# Patient Record
Sex: FEMALE | Race: WHITE | NOT HISPANIC OR LATINO | URBAN - METROPOLITAN AREA
[De-identification: names, ages, dates, MRNs, and addresses within clinical notes are randomized per-mention and may not be internally consistent; named-entity substitution may affect disease eponyms.]

---

## 2017-01-03 ENCOUNTER — OFFICE VISIT (OUTPATIENT)
Dept: ENDOCRINOLOGY | Age: 62
End: 2017-01-03

## 2017-01-03 VITALS
BODY MASS INDEX: 29.88 KG/M2 | HEIGHT: 64 IN | HEART RATE: 83 BPM | DIASTOLIC BLOOD PRESSURE: 78 MMHG | SYSTOLIC BLOOD PRESSURE: 118 MMHG | WEIGHT: 175 LBS

## 2017-01-03 DIAGNOSIS — N95.1 SYMPTOMATIC MENOPAUSAL OR FEMALE CLIMACTERIC STATES: Primary | ICD-10-CM

## 2017-01-03 DIAGNOSIS — I10 ESSENTIAL HYPERTENSION: ICD-10-CM

## 2017-01-03 PROCEDURE — 99214 OFFICE O/P EST MOD 30 MIN: CPT | Performed by: INTERNAL MEDICINE

## 2017-01-03 NOTE — LETTER
January 3, 2017     Emilie Sanchez MD  [facility] [address]  [facility] [address]    Patient: Jose Haley   YOB: 1955   Date of Visit: 1/3/2017       Dear Dr. Daniel MD:    Jose Haley was in my office today. Below are the relevant portions of my assessment and plan of care.           If you have questions, please do not hesitate to call me. I look forward to following Jose along with you.         Sincerely,        Clark Ritter MD        CC: No Recipients

## 2017-01-03 NOTE — PROGRESS NOTES
61 y.o.    Patient Care Team:  Emilie Sanchez MD as PCP - General (Internal Medicine)  No Known Provider as PCP - Family Medicine    Chief Complaint:      Subjective     HPI   Jose Haley,61 y.o. WF is here as a follow up for the evaluation of menopausal HRT evaluation. Referred by Dr. Sanchez.   Pt has been on estrogen conjugated - medroxyprogesterone (PREMPRO) 0.45-1.5 MG per tablet for 8 - 10 years. It has been started by her Gynecologist. Pt has been trying to get herself off of the HRT for the last 4 months. Also tried 1 year ago and failed. When she stops the medication she gets mood changes, hot flashes and reports that her BP goes up. Pt was started on the medication 10 years ago due to menopausal symptoms mainly night sweats. She was 51 years old when she was first started on the medication.   Pt wants to take herself off of the HRT because cancer runs in her family mainly (Lung ca), Maternal grand father - colon cancer, first cousin on paternal side - had breast cancer.   During last visit i discussed with the pt to wean herself off of HRT slowly.   She is currently off of estrogen, conjugated, progesterone since thanks giving. She has been started on Venlafaxine XR 75 mg po daily last visit. She reports that her mood is stable now, no more night sweats, hot flashes occasionally.   She lost about 5 pounds of weight since last visit.     She reports that her BP at home - 140/82 mm of hg. Her BP today in the clinic - 118/78 mm of hg.     Interval History      The following portions of the patient's history were reviewed and updated as appropriate: allergies, current medications, past family history, past medical history, past social history, past surgical history and problem list.    No past medical history on file.  No family history on file.  Social History     Social History   • Marital status: Single     Spouse name: N/A   • Number of children: N/A   • Years of education: N/A     Occupational  History   • Not on file.     Social History Main Topics   • Smoking status: Never Smoker   • Smokeless tobacco: Not on file   • Alcohol use Not on file   • Drug use: Not on file   • Sexual activity: Not on file     Other Topics Concern   • Not on file     Social History Narrative     Allergies   Allergen Reactions   • Latex      RASH       Current Outpatient Prescriptions:   •  B COMPLEX VITAMINS SL, Take  by mouth., Disp: , Rfl:   •  Omega-3 Fatty Acids (FISH OIL) 1000 MG capsule capsule, Take  by mouth Daily With Breakfast., Disp: , Rfl:   •  Probiotic Product (PROBIOTIC DAILY PO), Take  by mouth., Disp: , Rfl:   •  venlafaxine XR (EFFEXOR XR) 75 MG 24 hr capsule, Take 1 capsule by mouth Daily., Disp: 30 capsule, Rfl: 2  •  vitamin E 100 UNIT capsule, Take 100 Units by mouth Daily., Disp: , Rfl:         Review of Systems   Constitutional: Negative for appetite change, chills, diaphoresis and fatigue.   HENT: Positive for postnasal drip. Negative for hearing loss, nosebleeds, trouble swallowing and voice change.    Eyes: Negative for photophobia, discharge and visual disturbance.   Respiratory: Negative for cough, shortness of breath and wheezing.    Cardiovascular: Negative for chest pain, palpitations and leg swelling.   Gastrointestinal: Positive for abdominal pain. Negative for abdominal distention, constipation, diarrhea, nausea and vomiting.   Endocrine: Positive for polydipsia. Negative for cold intolerance, heat intolerance and polyuria.   Genitourinary: Negative for dysuria, frequency and hematuria.   Musculoskeletal: Negative for arthralgias, back pain and myalgias.   Skin: Negative for pallor, rash and wound.   Neurological: Negative for dizziness, tremors, seizures, syncope, weakness, numbness and headaches.   Hematological: Negative for adenopathy.   Psychiatric/Behavioral: Negative for agitation, confusion and sleep disturbance. The patient is not nervous/anxious.        Objective       Vitals:     "01/03/17 1025   BP: 118/78   Pulse: 83   Weight: 175 lb (79.4 kg)   Height: 64\" (162.6 cm)     Body mass index is 30.04 kg/(m^2).      Physical Exam   Constitutional: She is oriented to person, place, and time. She appears well-developed and well-nourished. No distress.   HENT:   Head: Normocephalic and atraumatic.   Mouth/Throat: Oropharynx is clear and moist.   Eyes: Conjunctivae and EOM are normal. Pupils are equal, round, and reactive to light. No scleral icterus.   Neck: Normal range of motion. Neck supple. No tracheal deviation present. No thyromegaly present.   Cardiovascular: Normal rate, regular rhythm and normal heart sounds.  Exam reveals no friction rub.    No murmur heard.  Pulmonary/Chest: Effort normal and breath sounds normal. No stridor. She has no wheezes. She has no rales.   Abdominal: Soft. Bowel sounds are normal. She exhibits no distension. There is no tenderness.   Musculoskeletal: Normal range of motion. She exhibits no edema.   Lymphadenopathy:     She has no cervical adenopathy.   Neurological: She is alert and oriented to person, place, and time. She has normal reflexes. She displays normal reflexes.   Skin: Skin is warm and dry. She is not diaphoretic.   Psychiatric: She has a normal mood and affect. Judgment normal.   Vitals reviewed.    Results Review:     I reviewed the patient's new clinical results.    Medical records reviewed  Summary:Done      Office Visit on 10/25/2016   Component Date Value Ref Range Status   • 25 Hydroxy, Vitamin D 10/25/2016 29.3* 30.0 - 100.0 ng/mL Final    Comment: Reference Range for Total Vitamin D 25(OH)  Deficiency    <20.0 ng/mL  Insufficiency 21-29 ng/mL  Sufficiency    ng/mL  Toxicity      >100 ng/ml        • Vitamin B-12 10/25/2016 392  211 - 946 pg/mL Final   • Folate 10/25/2016 16.25  7.30 - 26.10 ng/mL Final   • Hemoglobin A1C 10/25/2016 5.19  4.80 - 5.60 % Final    Comment: Hemoglobin A1C Ranges:  Increased Risk for Diabetes  5.7% to " 6.4%  Diabetes                     >= 6.5%  Diabetic Goal                < 7.0%     • TSH 10/25/2016 2.450  0.270 - 4.200 mIU/mL Final   • Glucose 10/25/2016 120* 65 - 99 mg/dL Final   • BUN 10/25/2016 12  6 - 20 mg/dL Final   • Creatinine 10/25/2016 0.68  0.57 - 1.00 mg/dL Final   • eGFR Non  Am 10/25/2016 89  >60 mL/min/1.73 Final   • eGFR African Am 10/25/2016 107  >60 mL/min/1.73 Final   • BUN/Creatinine Ratio 10/25/2016 17.6  7.0 - 25.0 Final   • Sodium 10/25/2016 143  136 - 145 mmol/L Final   • Potassium 10/25/2016 4.3  3.5 - 5.2 mmol/L Final   • Chloride 10/25/2016 102  98 - 107 mmol/L Final   • Total CO2 10/25/2016 24.5  22.0 - 29.0 mmol/L Final   • Calcium 10/25/2016 10.0  8.6 - 10.5 mg/dL Final   • Total Protein 10/25/2016 6.9  6.0 - 8.5 g/dL Final   • Albumin 10/25/2016 4.90  3.50 - 5.20 g/dL Final   • Globulin 10/25/2016 2.0  gm/dL Final   • A/G Ratio 10/25/2016 2.5  g/dL Final   • Total Bilirubin 10/25/2016 0.2  0.1 - 1.2 mg/dL Final   • Alkaline Phosphatase 10/25/2016 64  39 - 117 U/L Final   • AST (SGOT) 10/25/2016 12  1 - 32 U/L Final   • ALT (SGPT) 10/25/2016 15  1 - 33 U/L Final   • Total Cholesterol 10/25/2016 189  0 - 200 mg/dL Final   • Triglycerides 10/25/2016 376* 0 - 150 mg/dL Final   • HDL Cholesterol 10/25/2016 49  40 - 60 mg/dL Final   • VLDL Cholesterol 10/25/2016 75.2* 5 - 40 mg/dL Final   • LDL Cholesterol  10/25/2016 65  0 - 100 mg/dL Final     Lab Results   Component Value Date    HGBA1C 5.19 10/25/2016     Lab Results   Component Value Date    CREATININE 0.68 10/25/2016     Imaging Results (most recent)     None                Assessment and Plan:    Diagnoses and all orders for this visit:    Symptomatic menopausal or female climacteric states  -     Vitamin D 25 Hydroxy; Future  -     Comprehensive Metabolic Panel; Future  -     TSH; Future  -     Lipid Panel; Future    Essential hypertension  -     Vitamin D 25 Hydroxy; Future  -     Comprehensive Metabolic Panel; Future  -      TSH; Future  -     Lipid Panel; Future    Menopausal symptoms  Will continue venlafaxine 75 mg oral daily  Patient is currently off of hormone replacement therapy and feels better on venlafaxine.    Vitamin D deficiency  Advised patient to take 2 pills of over-the-counter vitamin D.    Will repeat lipid panel prior to her next visit.    The total time spent more than 25 min for lab review and face- to- face, of which greater than 50% of time was spent in counseling the patient on treatment options, instructions for management/treatment and follow up and importance of compliance with chosen management or treatment options

## 2017-01-03 NOTE — MR AVS SNAPSHOT
Jose Haley   1/3/2017 10:15 AM   Office Visit    Dept Phone:  [phone]   Encounter #:  [id number]    Provider:  Clark Ritter MD   Department:  [facility] ENDOCRINOLOGY                Your Full Care Plan              Today's Medication Changes          These changes are accurate as of: 1/3/17 11:05 AM.  If you have any questions, ask your nurse or doctor.               Stop taking medication(s)listed here:     PREMPRO 0.45-1.5 MG per tablet   Generic drug:  estrogen (conjugated)-medroxyprogesterone   Stopped by:  Clark Ritter MD                      Your Updated Medication List          This list is accurate as of: 1/3/17 11:05 AM.  Always use your most recent med list.                B COMPLEX VITAMINS SL       fish oil 1000 MG capsule capsule       PROBIOTIC DAILY PO       venlafaxine XR 75 MG 24 hr capsule   Commonly known as:  EFFEXOR XR   Take 1 capsule by mouth Daily.       vitamin E 100 UNIT capsule               You Were Diagnosed With        Codes Comments    Symptomatic menopausal or female climacteric states    -  Primary ICD-10-CM: N95.1  ICD-9-CM: 627.2     Essential hypertension     ICD-10-CM: I10  ICD-9-CM: 401.9       Instructions     None    Patient Instructions History      Upcoming Appointments     Visit Type Date Time Department    OFFICE VISIT 1/3/2017 10:15 AM [facility]    [facility] 3/20/2017  8:40 AM [facility]    OFFICE VISIT 4/3/2017  8:30 AM [facility]      [facility] Signup     Our records indicate that you have an active [employer] account.    You can view your After Visit Summary by going to [employer] and logging in with your [employer] username and password.  If you don't have a [facility] username and password but a parent or guardian has access to your record, the parent or guardian should login with their own [employer] username and password and access your record to view the After Visit  "Summary.    If you have questions, you can email [email or web address] or call [phone] to talk to our MyChart staff.  Remember, [facility] is NOT to be used for urgent needs.  For medical emergencies, dial 911.               Other Info from Your Visit           Your Appointments     Mar 20, 2017  8:40 AM EDT   [facility]   [facility] ENDOCRINOLOGY (--)    [address] [place] [id number] [phone]            Apr 03, 2017  8:30 AM EDT   Office Visit with Clark Ritter MD   [facility] ENDOCRINOLOGY (--)    [address] [place] [id number] [phone]           Arrive 15 minutes prior to appointment.              Allergies     Latex      RASH      Vital Signs     Blood Pressure Pulse Height Weight Body Mass Index Smoking Status    118/78 83 64\" (162.6 cm) 175 lb (79.4 kg) 30.04 kg/m2 Never Smoker      Problems and Diagnoses Noted     Menopause    -  Primary    High blood pressure            "

## 2017-01-03 NOTE — LETTER
January 3, 2017     Emilie Sanchez MD  [facility] [address]  [facility] [address]    Patient: Jose Haley   YOB: 1955   Date of Visit: 1/3/2017       Dear Dr. Daniel MD:    Thank you for referring Jose Haley to me for evaluation. Below are the relevant portions of my assessment and plan of care.                   If you have questions, please do not hesitate to call me. I look forward to following Jose along with you.         Sincerely,        Clark Ritter MD        CC: No Recipients

## 2017-01-19 RX ORDER — VENLAFAXINE HYDROCHLORIDE 75 MG/1
CAPSULE, EXTENDED RELEASE ORAL
Qty: 30 CAPSULE | Refills: 1 | Status: SHIPPED | OUTPATIENT
Start: 2017-01-19 | End: 2017-03-17 | Stop reason: SDUPTHER

## 2017-02-28 ENCOUNTER — TELEPHONE (OUTPATIENT)
Dept: ENDOCRINOLOGY | Age: 62
End: 2017-02-28

## 2017-02-28 NOTE — TELEPHONE ENCOUNTER
Patient left message though my chart about appt  Have been trying to reach patient left voice mail for call back

## 2017-03-17 RX ORDER — VENLAFAXINE HYDROCHLORIDE 75 MG/1
CAPSULE, EXTENDED RELEASE ORAL
Qty: 30 CAPSULE | Refills: 0 | Status: SHIPPED | OUTPATIENT
Start: 2017-03-17 | End: 2017-04-12 | Stop reason: SDUPTHER

## 2017-03-20 ENCOUNTER — LAB (OUTPATIENT)
Dept: ENDOCRINOLOGY | Age: 62
End: 2017-03-20

## 2017-03-20 DIAGNOSIS — E55.9 VITAMIN D DEFICIENCY: ICD-10-CM

## 2017-03-20 DIAGNOSIS — N95.1 SYMPTOMATIC MENOPAUSAL OR FEMALE CLIMACTERIC STATES: ICD-10-CM

## 2017-03-20 DIAGNOSIS — E55.9 VITAMIN D DEFICIENCY: Primary | ICD-10-CM

## 2017-03-20 DIAGNOSIS — I10 ESSENTIAL HYPERTENSION: ICD-10-CM

## 2017-03-20 LAB
ALBUMIN SERPL-MCNC: 4.5 G/DL (ref 3.5–5.2)
ALBUMIN/GLOB SERPL: 2.1 G/DL
ALP SERPL-CCNC: 78 U/L (ref 39–117)
ALT SERPL-CCNC: 41 U/L (ref 1–33)
AST SERPL-CCNC: 25 U/L (ref 1–32)
BILIRUB SERPL-MCNC: 0.3 MG/DL (ref 0.1–1.2)
BUN SERPL-MCNC: 15 MG/DL (ref 6–20)
BUN/CREAT SERPL: 17.2 (ref 7–25)
CALCIUM SERPL-MCNC: 9.6 MG/DL (ref 8.6–10.5)
CHLORIDE SERPL-SCNC: 105 MMOL/L (ref 98–107)
CHOLEST SERPL-MCNC: 176 MG/DL (ref 0–200)
CO2 SERPL-SCNC: 24.4 MMOL/L (ref 22–29)
CREAT SERPL-MCNC: 0.87 MG/DL (ref 0.57–1)
GLOBULIN SER CALC-MCNC: 2.1 GM/DL
GLUCOSE SERPL-MCNC: 132 MG/DL (ref 65–99)
HDLC SERPL-MCNC: 45 MG/DL (ref 40–60)
LDLC SERPL CALC-MCNC: 95 MG/DL (ref 0–100)
POTASSIUM SERPL-SCNC: 4.4 MMOL/L (ref 3.5–5.2)
PROT SERPL-MCNC: 6.6 G/DL (ref 6–8.5)
SODIUM SERPL-SCNC: 142 MMOL/L (ref 136–145)
TRIGL SERPL-MCNC: 179 MG/DL (ref 0–150)
TSH SERPL DL<=0.005 MIU/L-ACNC: 3.18 MIU/ML (ref 0.27–4.2)
VLDLC SERPL CALC-MCNC: 35.8 MG/DL (ref 5–40)

## 2017-03-21 LAB — 25(OH)D3+25(OH)D2 SERPL-MCNC: 25.2 NG/ML (ref 30–100)

## 2017-04-10 ENCOUNTER — OFFICE VISIT (OUTPATIENT)
Dept: ENDOCRINOLOGY | Age: 62
End: 2017-04-10

## 2017-04-10 VITALS
HEIGHT: 63 IN | WEIGHT: 179.2 LBS | HEART RATE: 93 BPM | SYSTOLIC BLOOD PRESSURE: 134 MMHG | OXYGEN SATURATION: 93 % | BODY MASS INDEX: 31.75 KG/M2 | DIASTOLIC BLOOD PRESSURE: 80 MMHG

## 2017-04-10 DIAGNOSIS — E55.9 VITAMIN D DEFICIENCY: ICD-10-CM

## 2017-04-10 DIAGNOSIS — N95.1 SYMPTOMATIC MENOPAUSAL OR FEMALE CLIMACTERIC STATES: Primary | ICD-10-CM

## 2017-04-10 DIAGNOSIS — N95.1 HOT FLASHES DUE TO MENOPAUSE: ICD-10-CM

## 2017-04-10 DIAGNOSIS — R73.01 IMPAIRED FASTING GLUCOSE: ICD-10-CM

## 2017-04-10 PROCEDURE — 99214 OFFICE O/P EST MOD 30 MIN: CPT | Performed by: INTERNAL MEDICINE

## 2017-04-10 NOTE — PROGRESS NOTES
62 y.o.    Patient Care Team:  Emilie Sanchez MD as PCP - General (Internal Medicine)  No Known Provider as PCP - Family Medicine    Chief Complaint:      Subjective     HPI  Jose Haley,61 y.o. WF is here as a follow up for the evaluation of menopausal symptoms-hot flashes.  Referred by Dr. Sanchez.     Pt has been on estrogen conjugated - medroxyprogesterone (PREMPRO) 0.45-1.5 MG per tablet for 8 - 10 years. It has been started by her Gynecologist. Pt was started on the medication 10 years ago due to menopausal symptoms mainly night sweats.  Patient has a strong family history of cancers mainly lung cancer, colon cancer and first cousin on the paternal side has history of breast cancer.   Patient stopped hormone replacement therapy in November 2016.    Patient was started on venlafaxine XL 75 mg oral daily, currently tolerating the medication with no side effects, reports that her mood is stable now, no more night sweats but does have hot flashes 4-5 times a day.    Reports that her weight is stable.      Interval History      The following portions of the patient's history were reviewed and updated as appropriate: allergies, current medications, past family history, past medical history, past social history, past surgical history and problem list.    No past medical history on file.  No family history on file.  Social History     Social History   • Marital status: Single     Spouse name: N/A   • Number of children: N/A   • Years of education: N/A     Occupational History   • Not on file.     Social History Main Topics   • Smoking status: Never Smoker   • Smokeless tobacco: Not on file   • Alcohol use Not on file   • Drug use: Not on file   • Sexual activity: Not on file     Other Topics Concern   • Not on file     Social History Narrative     Allergies   Allergen Reactions   • Latex      RASH       Current Outpatient Prescriptions:   •  B COMPLEX VITAMINS SL, Take  by mouth., Disp: , Rfl:   •  Omega-3  "Fatty Acids (FISH OIL) 1000 MG capsule capsule, Take  by mouth Daily With Breakfast., Disp: , Rfl:   •  Probiotic Product (PROBIOTIC DAILY PO), Take  by mouth., Disp: , Rfl:   •  venlafaxine XR (EFFEXOR-XR) 75 MG 24 hr capsule, TAKE ONE CAPSULE BY MOUTH DAILY, Disp: 30 capsule, Rfl: 0  •  vitamin E 100 UNIT capsule, Take 100 Units by mouth Daily., Disp: , Rfl:         Review of Systems   Constitutional: Negative for appetite change, chills and fatigue.   HENT: Positive for postnasal drip. Negative for nosebleeds, trouble swallowing and voice change.    Eyes: Negative for photophobia, discharge and visual disturbance.   Respiratory: Positive for cough. Negative for shortness of breath and wheezing.    Cardiovascular: Negative for chest pain, palpitations and leg swelling.   Gastrointestinal: Negative for abdominal pain, constipation, diarrhea, nausea and vomiting.   Endocrine: Positive for heat intolerance. Negative for cold intolerance, polydipsia and polyuria.   Genitourinary: Negative for dysuria, frequency and hematuria.   Musculoskeletal: Negative for arthralgias, back pain and myalgias.   Skin: Negative for rash and wound.   Neurological: Negative for dizziness, tremors, seizures, syncope, weakness, numbness and headaches.   Hematological: Negative for adenopathy.   Psychiatric/Behavioral: Negative for agitation, confusion and sleep disturbance. The patient is not nervous/anxious.        Objective       Vitals:    04/10/17 1240   BP: 134/80   Pulse: 93   SpO2: 93%   Weight: 179 lb 3.2 oz (81.3 kg)   Height: 63\" (160 cm)     Body mass index is 31.74 kg/(m^2).      Physical Exam   Constitutional: She is oriented to person, place, and time. She appears well-nourished.   HENT:   Head: Normocephalic and atraumatic.   Eyes: Conjunctivae and EOM are normal. No scleral icterus.   Neck: Normal range of motion. Neck supple. No thyromegaly present.   Cardiovascular: Normal rate and normal heart sounds.  Exam reveals no " friction rub.    No murmur heard.  Pulmonary/Chest: Effort normal and breath sounds normal. No stridor. She has no wheezes. She has no rales.   Abdominal: Soft. Bowel sounds are normal. She exhibits no distension. There is no tenderness.   Musculoskeletal: She exhibits no edema or tenderness.   Lymphadenopathy:     She has no cervical adenopathy.   Neurological: She is alert and oriented to person, place, and time.   Skin: Skin is warm and dry. She is not diaphoretic.   Psychiatric: She has a normal mood and affect.   Vitals reviewed.    Results Review:     I reviewed the patient's new clinical results.    Medical records reviewed  Summary:Done      Lab on 03/20/2017   Component Date Value Ref Range Status   • Glucose 03/20/2017 132* 65 - 99 mg/dL Final   • BUN 03/20/2017 15  6 - 20 mg/dL Final   • Creatinine 03/20/2017 0.87  0.57 - 1.00 mg/dL Final   • eGFR Non  Am 03/20/2017 67  >60 mL/min/1.73 Final   • eGFR African Am 03/20/2017 81  >60 mL/min/1.73 Final   • BUN/Creatinine Ratio 03/20/2017 17.2  7.0 - 25.0 Final   • Sodium 03/20/2017 142  136 - 145 mmol/L Final   • Potassium 03/20/2017 4.4  3.5 - 5.2 mmol/L Final   • Chloride 03/20/2017 105  98 - 107 mmol/L Final   • Total CO2 03/20/2017 24.4  22.0 - 29.0 mmol/L Final   • Calcium 03/20/2017 9.6  8.6 - 10.5 mg/dL Final   • Total Protein 03/20/2017 6.6  6.0 - 8.5 g/dL Final   • Albumin 03/20/2017 4.50  3.50 - 5.20 g/dL Final   • Globulin 03/20/2017 2.1  gm/dL Final   • A/G Ratio 03/20/2017 2.1  g/dL Final   • Total Bilirubin 03/20/2017 0.3  0.1 - 1.2 mg/dL Final   • Alkaline Phosphatase 03/20/2017 78  39 - 117 U/L Final   • AST (SGOT) 03/20/2017 25  1 - 32 U/L Final   • ALT (SGPT) 03/20/2017 41* 1 - 33 U/L Final   • TSH 03/20/2017 3.180  0.270 - 4.200 mIU/mL Final   • Total Cholesterol 03/20/2017 176  0 - 200 mg/dL Final   • Triglycerides 03/20/2017 179* 0 - 150 mg/dL Final   • HDL Cholesterol 03/20/2017 45  40 - 60 mg/dL Final   • VLDL Cholesterol  03/20/2017 35.8  5 - 40 mg/dL Final   • LDL Cholesterol  03/20/2017 95  0 - 100 mg/dL Final   • 25 Hydroxy, Vitamin D 03/20/2017 25.2* 30.0 - 100.0 ng/mL Final    Comment: Vitamin D deficiency has been defined by the [place] of  Medicine and an Endocrine Society practice guideline as a  level of serum 25-OH vitamin D less than 20 ng/mL (1,2).  The Endocrine Society went on to further define vitamin D  insufficiency as a level between 21 and 29 ng/mL (2).  1. IOM ([place] of Medicine). 2010. Dietary reference     intakes for calcium and D. [place]: The     National [employer].  2. Angel MF, Nima KHANNA, Mickey BENNETT, et al.     Evaluation, treatment, and prevention of vitamin D     deficiency: an Endocrine Society clinical practice     guideline. JCEM. 2011 Jul; 96(7):1911-30.       Lab Results   Component Value Date    HGBA1C 5.19 10/25/2016     Lab Results   Component Value Date    CREATININE 0.87 03/20/2017     Imaging Results (most recent)     None                Assessment and Plan:    Diagnoses and all orders for this visit:    Symptomatic menopausal or female climacteric states    Other orders  -     Hemoglobin A1c; Future  -     Vitamin B12 & Folate; Future  -     Vitamin D 25 Hydroxy; Future  -     TSH; Future    Hot flashes  Continue Effexor-XR 75 mg oral daily.  Discussed about increasing the dose or changing the medication for better control of hot flashes.  Patient at this point didnt want to make any changes.  She would rather give us a call in case if her symptoms or worse.    Will check HbA1c prior to next visit.  Discussed with the patient about mildly elevated fasting blood sugar and advised her to watch her diet and exercise.    Vitamin D deficiency  Taking over-the-counter vitamin D supplementation     The total time spent more than 25 min for old record and lab review and face- to- face, of which greater than 50% of time was spent in counseling the patient on treatment  options, instructions for management/treatment and follow up and importance of compliance with chosen management or treatment options

## 2017-04-12 RX ORDER — VENLAFAXINE HYDROCHLORIDE 75 MG/1
CAPSULE, EXTENDED RELEASE ORAL
Qty: 30 CAPSULE | Refills: 0 | Status: SHIPPED | OUTPATIENT
Start: 2017-04-12 | End: 2017-05-08 | Stop reason: SDUPTHER

## 2017-05-08 RX ORDER — VENLAFAXINE HYDROCHLORIDE 75 MG/1
CAPSULE, EXTENDED RELEASE ORAL
Qty: 30 CAPSULE | Refills: 0 | Status: SHIPPED | OUTPATIENT
Start: 2017-05-08 | End: 2017-06-06 | Stop reason: SDUPTHER

## 2017-06-06 RX ORDER — VENLAFAXINE HYDROCHLORIDE 75 MG/1
CAPSULE, EXTENDED RELEASE ORAL
Qty: 30 CAPSULE | Refills: 0 | Status: SHIPPED | OUTPATIENT
Start: 2017-06-06 | End: 2017-07-05 | Stop reason: SDUPTHER

## 2017-07-05 RX ORDER — VENLAFAXINE HYDROCHLORIDE 75 MG/1
CAPSULE, EXTENDED RELEASE ORAL
Qty: 30 CAPSULE | Refills: 0 | Status: SHIPPED | OUTPATIENT
Start: 2017-07-05 | End: 2017-08-15 | Stop reason: SDUPTHER

## 2017-08-02 RX ORDER — VENLAFAXINE HYDROCHLORIDE 75 MG/1
CAPSULE, EXTENDED RELEASE ORAL
Qty: 30 CAPSULE | Refills: 0 | OUTPATIENT
Start: 2017-08-02

## 2017-08-15 RX ORDER — VENLAFAXINE HYDROCHLORIDE 75 MG/1
CAPSULE, EXTENDED RELEASE ORAL
Qty: 30 CAPSULE | Refills: 1 | Status: SHIPPED | OUTPATIENT
Start: 2017-08-15 | End: 2017-10-10 | Stop reason: SDUPTHER

## 2017-09-26 ENCOUNTER — LAB (OUTPATIENT)
Dept: ENDOCRINOLOGY | Age: 62
End: 2017-09-26

## 2017-09-26 DIAGNOSIS — E55.9 VITAMIN D DEFICIENCY: ICD-10-CM

## 2017-09-26 DIAGNOSIS — N95.1 HOT FLASHES DUE TO MENOPAUSE: ICD-10-CM

## 2017-09-26 DIAGNOSIS — R73.01 IMPAIRED FASTING GLUCOSE: ICD-10-CM

## 2017-09-26 DIAGNOSIS — N95.1 SYMPTOMATIC MENOPAUSAL OR FEMALE CLIMACTERIC STATES: ICD-10-CM

## 2017-09-26 LAB
25(OH)D3+25(OH)D2 SERPL-MCNC: 32.2 NG/ML (ref 30–100)
FOLATE SERPL-MCNC: 16.91 NG/ML (ref 4.78–24.2)
HBA1C MFR BLD: 6.01 % (ref 4.8–5.6)
TSH SERPL DL<=0.005 MIU/L-ACNC: 2.77 MIU/ML (ref 0.27–4.2)
VIT B12 SERPL-MCNC: 431 PG/ML (ref 211–946)

## 2017-10-10 ENCOUNTER — OFFICE VISIT (OUTPATIENT)
Dept: ENDOCRINOLOGY | Age: 62
End: 2017-10-10

## 2017-10-10 ENCOUNTER — APPOINTMENT (OUTPATIENT)
Dept: WOMENS IMAGING | Facility: HOSPITAL | Age: 62
End: 2017-10-10

## 2017-10-10 VITALS
HEART RATE: 68 BPM | DIASTOLIC BLOOD PRESSURE: 78 MMHG | BODY MASS INDEX: 32.25 KG/M2 | SYSTOLIC BLOOD PRESSURE: 116 MMHG | OXYGEN SATURATION: 95 % | WEIGHT: 182 LBS | HEIGHT: 63 IN

## 2017-10-10 DIAGNOSIS — R73.03 PREDIABETES: ICD-10-CM

## 2017-10-10 DIAGNOSIS — N95.1 SYMPTOMATIC MENOPAUSAL OR FEMALE CLIMACTERIC STATES: Primary | ICD-10-CM

## 2017-10-10 PROCEDURE — 77067 SCR MAMMO BI INCL CAD: CPT | Performed by: RADIOLOGY

## 2017-10-10 PROCEDURE — 77063 BREAST TOMOSYNTHESIS BI: CPT | Performed by: RADIOLOGY

## 2017-10-10 PROCEDURE — G0202 SCR MAMMO BI INCL CAD: HCPCS | Performed by: RADIOLOGY

## 2017-10-10 PROCEDURE — 99214 OFFICE O/P EST MOD 30 MIN: CPT | Performed by: INTERNAL MEDICINE

## 2017-10-10 RX ORDER — VENLAFAXINE HYDROCHLORIDE 75 MG/1
CAPSULE, EXTENDED RELEASE ORAL
Qty: 30 CAPSULE | Refills: 1 | Status: SHIPPED | OUTPATIENT
Start: 2017-10-10 | End: 2017-12-10 | Stop reason: SDUPTHER

## 2017-10-10 NOTE — PROGRESS NOTES
62 y.o.    Patient Care Team:  Emilie Sanchez MD as PCP - General (Internal Medicine)  No Known Provider as PCP - Family Medicine    Chief Complaint:    6 Month Follow Up/Symptomatic menopausal   Subjective     HPI    Jose Haley,61 y.o. WF is here as a follow up for the evaluation of menopausal symptoms-hot flashes.  Referred by Dr. Sanchez.      Pt has been on estrogen conjugated - medroxyprogesterone (PREMPRO) 0.45-1.5 MG per tablet for 8 - 10 years. It has been started by her Gynecologist. Pt was started on the medication 10 years ago due to menopausal symptoms mainly night sweats.  Patient has a strong family history of cancers mainly lung cancer, colon cancer and first cousin on the paternal side has history of breast cancer.   Patient stopped hormone replacement therapy in November 2016.    Currently patient is on venlafaxine 75 mg oral daily, tolerating the medication with no side effects but reports that her heart flashes still happen during the day at least 3-4 times a day, associated with some sweating episodes, but has been related to stable.  Reports that her weight is stable.    Blood work up also showed that patient's HbA1c is 6.01%.    Interval History      The following portions of the patient's history were reviewed and updated as appropriate: allergies, current medications, past family history, past medical history, past social history, past surgical history and problem list.    No past medical history on file.  No family history on file.  Social History     Social History   • Marital status: Single     Spouse name: N/A   • Number of children: N/A   • Years of education: N/A     Occupational History   • Not on file.     Social History Main Topics   • Smoking status: Never Smoker   • Smokeless tobacco: Not on file   • Alcohol use Not on file   • Drug use: Not on file   • Sexual activity: Not on file     Other Topics Concern   • Not on file     Social History Narrative     Allergies  "  Allergen Reactions   • Latex      RASH       Current Outpatient Prescriptions:   •  B COMPLEX VITAMINS SL, Take  by mouth., Disp: , Rfl:   •  Omega-3 Fatty Acids (FISH OIL) 1000 MG capsule capsule, Take  by mouth Daily With Breakfast., Disp: , Rfl:   •  Probiotic Product (PROBIOTIC DAILY PO), Take  by mouth., Disp: , Rfl:   •  venlafaxine XR (EFFEXOR-XR) 75 MG 24 hr capsule, Take 1 tab twice daily, Disp: 30 capsule, Rfl: 1  •  vitamin E 100 UNIT capsule, Take 100 Units by mouth Daily., Disp: , Rfl:         Review of Systems   Constitutional: Negative for fever.   HENT: Negative for facial swelling, nosebleeds, trouble swallowing and voice change.    Eyes: Negative for pain and redness.   Respiratory: Negative for shortness of breath and wheezing.    Cardiovascular: Positive for leg swelling. Negative for palpitations.   Gastrointestinal: Negative for abdominal pain, diarrhea and vomiting.   Endocrine: Negative for polydipsia and polyuria.   Genitourinary: Negative for decreased urine volume and frequency.   Musculoskeletal: Negative for joint swelling and neck pain.   Skin: Negative for rash.   Allergic/Immunologic: Negative for immunocompromised state.   Neurological: Negative for seizures and facial asymmetry.   Hematological: Bruises/bleeds easily.   Psychiatric/Behavioral: Negative for agitation and confusion.       Objective       Vitals:    10/10/17 0948   BP: 116/78   Pulse: 68   SpO2: 95%   Weight: 182 lb (82.6 kg)   Height: 63\" (160 cm)     Body mass index is 32.24 kg/(m^2).      Physical Exam   Gen exam - alert and oriented x 3, obese female, not in distress.   HEENT - Mild acanthosis nigricans. Thyroid palpable.   Resp - Clear to auscultation.   CVS - S1,S2 heard and no murmurs.   Abd - Non tender, BS heard.   Ext - No edema and intact pin prick and proprioception.     Results Review:     I reviewed the patient's new clinical results.    Medical records reviewed  Summary: done      Lab on 09/26/2017 "   Component Date Value Ref Range Status   • Hemoglobin A1C 09/26/2017 6.01* 4.80 - 5.60 % Final    Comment: Hemoglobin A1C Ranges:  Increased Risk for Diabetes  5.7% to 6.4%  Diabetes                     >= 6.5%  Diabetic Goal                < 7.0%     • Vitamin B-12 09/26/2017 431  211 - 946 pg/mL Final   • Folate 09/26/2017 16.91  4.78 - 24.20 ng/mL Final   • 25 Hydroxy, Vitamin D 09/26/2017 32.2  30.0 - 100.0 ng/mL Final    Comment: Reference Range for Total Vitamin D 25(OH)  Deficiency    <20.0 ng/mL  Insufficiency 21-29 ng/mL  Sufficiency    ng/mL  Toxicity      >100 ng/ml        • TSH 09/26/2017 2.770  0.270 - 4.200 mIU/mL Final     Lab Results   Component Value Date    HGBA1C 6.01 (H) 09/26/2017    HGBA1C 5.19 10/25/2016     Lab Results   Component Value Date    CREATININE 0.87 03/20/2017     Imaging Results (most recent)     None                Assessment and Plan:    Jose was seen today for menopause.    Diagnoses and all orders for this visit:    Symptomatic menopausal or female climacteric states  -     Hemoglobin A1c; Future  -     TSH; Future  -     Vitamin B12 & Folate; Future  -     Vitamin D 25 Hydroxy; Future  -     Lipid Panel; Future    Prediabetes  -     Hemoglobin A1c; Future  -     TSH; Future  -     Vitamin B12 & Folate; Future  -     Vitamin D 25 Hydroxy; Future  -     Lipid Panel; Future    Other orders  -     venlafaxine XR (EFFEXOR-XR) 75 MG 24 hr capsule; Take 1 tab twice daily    Menopausal symptoms  Will increase the dose of venlafaxine to 150 mg oral daily.  Advised the patient to take half a tablet in the morning and half a tablet in the evening.  Discussed with the patient that she needs to watch spicy food, avoid chocolate to decrease these episodes.   Patient also reports that she drinks about 45 beers per week and that she needs to avoid these things because alcohol also increases hot flash episodes.    Pre-diabetes  Discussed with the patient about referring her to per  dietitian for diabetic education.  Patient deferred this at this point.  She wants to do her on diet control first.  She also didn't want to be started on metformin at this time.  Educated the patient on portion control, watching the calorie count, definitely advised her to significantly decrease the beer intake as it is not only good for her diabetes but also for liver.    14 minutes out of 25 minutes face to face spent in counseling the patient extensively on medication changes and diet education for diabetes

## 2017-10-12 RX ORDER — VENLAFAXINE HYDROCHLORIDE 75 MG/1
CAPSULE, EXTENDED RELEASE ORAL
Qty: 30 CAPSULE | Refills: 0 | OUTPATIENT
Start: 2017-10-12 | End: 2018-01-24

## 2017-12-11 RX ORDER — VENLAFAXINE HYDROCHLORIDE 150 MG/1
CAPSULE, EXTENDED RELEASE ORAL
Qty: 30 CAPSULE | Refills: 0 | Status: SHIPPED | OUTPATIENT
Start: 2017-12-11 | End: 2018-01-15 | Stop reason: SDUPTHER

## 2018-01-08 ENCOUNTER — RESULTS ENCOUNTER (OUTPATIENT)
Dept: ENDOCRINOLOGY | Age: 63
End: 2018-01-08

## 2018-01-08 DIAGNOSIS — N95.1 SYMPTOMATIC MENOPAUSAL OR FEMALE CLIMACTERIC STATES: ICD-10-CM

## 2018-01-08 DIAGNOSIS — R73.03 PREDIABETES: ICD-10-CM

## 2018-01-10 LAB
25(OH)D3+25(OH)D2 SERPL-MCNC: 29.6 NG/ML (ref 30–100)
CHOLEST SERPL-MCNC: 212 MG/DL (ref 0–200)
FOLATE SERPL-MCNC: 10.31 NG/ML (ref 4.78–24.2)
HBA1C MFR BLD: 5.9 % (ref 4.8–5.6)
HDLC SERPL-MCNC: 54 MG/DL (ref 40–60)
INTERPRETATION: NORMAL
LDLC SERPL CALC-MCNC: 127 MG/DL (ref 0–100)
TRIGL SERPL-MCNC: 154 MG/DL (ref 0–150)
TSH SERPL DL<=0.005 MIU/L-ACNC: 2.5 MIU/ML (ref 0.27–4.2)
VIT B12 SERPL-MCNC: 464 PG/ML (ref 211–946)
VLDLC SERPL CALC-MCNC: 30.8 MG/DL (ref 5–40)

## 2018-01-15 RX ORDER — VENLAFAXINE HYDROCHLORIDE 150 MG/1
CAPSULE, EXTENDED RELEASE ORAL
Qty: 30 CAPSULE | Refills: 0 | Status: SHIPPED | OUTPATIENT
Start: 2018-01-15 | End: 2018-02-17 | Stop reason: SDUPTHER

## 2018-01-24 ENCOUNTER — OFFICE VISIT (OUTPATIENT)
Dept: ENDOCRINOLOGY | Age: 63
End: 2018-01-24

## 2018-01-24 VITALS
HEART RATE: 78 BPM | WEIGHT: 176 LBS | HEIGHT: 63 IN | SYSTOLIC BLOOD PRESSURE: 128 MMHG | BODY MASS INDEX: 31.18 KG/M2 | OXYGEN SATURATION: 97 % | DIASTOLIC BLOOD PRESSURE: 78 MMHG

## 2018-01-24 DIAGNOSIS — Z78.0 POST-MENOPAUSAL: Primary | ICD-10-CM

## 2018-01-24 DIAGNOSIS — F45.8 TEETH GRINDING: ICD-10-CM

## 2018-01-24 PROCEDURE — 99214 OFFICE O/P EST MOD 30 MIN: CPT | Performed by: INTERNAL MEDICINE

## 2018-01-24 NOTE — PROGRESS NOTES
62 y.o.    Patient Care Team:  Emilie Sanchez MD as PCP - General (Internal Medicine)  No Known Provider as PCP - Family Medicine    Chief Complaint:    3 Month Follow Up/ Symptomatic menopausal  Subjective     HPI    Jose Haley, 62 y.o. WF is here as a follow up for the evaluation of menopausal symptoms-hot flashes.  Referred by Dr. Sanchez.       Pt has been on estrogen conjugated - medroxyprogesterone (PREMPRO) 0.45-1.5 MG per tablet for 8 - 10 years. It has been started by her Gynecologist. Pt was started on the medication 10 years ago due to menopausal symptoms mainly night sweats.  Patient has a strong family history of cancers mainly lung cancer, colon cancer and first cousin on the paternal side has history of breast cancer.   Patient stopped hormone replacement therapy in November 2016.    Currently on venlafaxine 150 mg oral daily.  Tolerating the medication with no side effects.  She reports that the hot flashes have significantly improved.  No significant sweating episodes.  Also reports that her weight is stable.    Prediabetes  Patient is watching her carbohydrate intake and has been exercising at least 2-3 times a week.  She reports that during the winter season she is not able to exercise due to the weather.    Teeth grinding   Patient reports that she has been grinding her teeth in the middle of the sleep and that has not improved in the last few months.  She has a disturbed sleep because of that.    LDL - elevated. Not on statins.     Interval History      The following portions of the patient's history were reviewed and updated as appropriate: allergies, current medications, past family history, past medical history, past social history, past surgical history and problem list.    No past medical history on file.  No family history on file.  Social History     Social History   • Marital status: Single     Spouse name: N/A   • Number of children: N/A   • Years of education: N/A  "    Occupational History   • Not on file.     Social History Main Topics   • Smoking status: Never Smoker   • Smokeless tobacco: Not on file   • Alcohol use Not on file   • Drug use: Not on file   • Sexual activity: Not on file     Other Topics Concern   • Not on file     Social History Narrative     Allergies   Allergen Reactions   • Latex      RASH       Current Outpatient Prescriptions:   •  B COMPLEX VITAMINS SL, Take  by mouth., Disp: , Rfl:   •  CINNAMON PO, Take  by mouth., Disp: , Rfl:   •  Magnesium 100 MG capsule, Take  by mouth., Disp: , Rfl:   •  Omega-3 Fatty Acids (FISH OIL) 1000 MG capsule capsule, Take  by mouth Daily With Breakfast., Disp: , Rfl:   •  Probiotic Product (PROBIOTIC DAILY PO), Take  by mouth., Disp: , Rfl:   •  venlafaxine XR (EFFEXOR-XR) 150 MG 24 hr capsule, TAKE ONE CAPSULE BY MOUTH DAILY, Disp: 30 capsule, Rfl: 0  •  vitamin E 100 UNIT capsule, Take 100 Units by mouth Daily., Disp: , Rfl:         Review of Systems   Constitutional: Negative for fever.   HENT: Negative for facial swelling, nosebleeds, trouble swallowing and voice change.    Eyes: Negative for pain and redness.   Respiratory: Negative for shortness of breath and wheezing.    Cardiovascular: Negative for palpitations and leg swelling.   Gastrointestinal: Negative for abdominal pain, diarrhea and vomiting.   Endocrine: Negative for polydipsia and polyuria.   Genitourinary: Negative for decreased urine volume and frequency.   Musculoskeletal: Negative for joint swelling and neck pain.   Skin: Negative for rash.   Allergic/Immunologic: Negative for immunocompromised state.   Neurological: Positive for headaches. Negative for seizures and facial asymmetry.   Hematological: Bruises/bleeds easily.   Psychiatric/Behavioral: Negative for agitation and confusion.       Objective       Vitals:    01/24/18 0945   BP: 128/78   Pulse: 78   SpO2: 97%   Weight: 79.8 kg (176 lb)   Height: 160 cm (63\")     Body mass index is 31.18 " kg/(m^2).      Physical Exam   Gen exam - alert and oriented x 3, obese female, not in distress.   HEENT - No acanthosis nigricans. Thyroid palpable.   Resp - Clear to auscultation.   CVS - S1,S2 heard and no murmurs.   Abd - Non tender, BS heard.   Ext - No edema and intact pin prick and proprioception.     Results Review:     I reviewed the patient's new clinical results.    Medical records reviewed  Summary: done      Results Encounter on 01/08/2018   Component Date Value Ref Range Status   • Hemoglobin A1C 01/10/2018 5.90* 4.80 - 5.60 % Final    Comment: Hemoglobin A1C Ranges:  Increased Risk for Diabetes  5.7% to 6.4%  Diabetes                     >= 6.5%  Diabetic Goal                < 7.0%     • TSH 01/10/2018 2.500  0.270 - 4.200 mIU/mL Final   • Vitamin B-12 01/10/2018 464  211 - 946 pg/mL Final   • Folate 01/10/2018 10.31  4.78 - 24.20 ng/mL Final   • 25 Hydroxy, Vitamin D 01/10/2018 29.6* 30.0 - 100.0 ng/mL Final    Comment: Reference Range for Total Vitamin D 25(OH)  Deficiency    <20.0 ng/mL  Insufficiency 21-29 ng/mL  Sufficiency    ng/mL  Toxicity      >100 ng/ml        • Total Cholesterol 01/10/2018 212* 0 - 200 mg/dL Final   • Triglycerides 01/10/2018 154* 0 - 150 mg/dL Final   • HDL Cholesterol 01/10/2018 54  40 - 60 mg/dL Final   • VLDL Cholesterol 01/10/2018 30.8  5 - 40 mg/dL Final   • LDL Cholesterol  01/10/2018 127* 0 - 100 mg/dL Final   • Interpretation 01/10/2018 Note   Final    Supplemental report is available.     Lab Results   Component Value Date    HGBA1C 5.90 (H) 01/10/2018    HGBA1C 6.01 (H) 09/26/2017    HGBA1C 5.19 10/25/2016     Lab Results   Component Value Date    CREATININE 0.87 03/20/2017     Imaging Results (most recent)     None                Assessment and Plan:    Jose was seen today for menopause.    Diagnoses and all orders for this visit:    Post-menopausal  -     Lipid Panel; Future  -     Hemoglobin A1c; Future    Teeth grinding  -     Lipid Panel; Future  -      Hemoglobin A1c; Future    Post menopausal symptoms  Hot flashes improved on venlafaxine  Continue the medication.    Borderline diabetes  Advised patient to watch her carbohydrate intake and continue to exercise.    Hyperlipidemia  Continue fish or L thousand milligrams once daily.  Discussed extensively with the patient about starting her on statin but she is reluctant on being started on medication.    Teeth grinding  Discussed with the patient about referring her for a sleep study she wanted to wait for a few more months before she makes a decision on this.    Reviewed Lab results with the patient.     14 minutes out of 25 minutes face to face spent in counseling the patient extensively on above clinical conditions and treatment plan.

## 2018-02-19 RX ORDER — VENLAFAXINE HYDROCHLORIDE 150 MG/1
CAPSULE, EXTENDED RELEASE ORAL
Qty: 30 CAPSULE | Refills: 1 | Status: SHIPPED | OUTPATIENT
Start: 2018-02-19 | End: 2018-04-12 | Stop reason: SDUPTHER

## 2018-04-12 RX ORDER — VENLAFAXINE HYDROCHLORIDE 150 MG/1
CAPSULE, EXTENDED RELEASE ORAL
Qty: 30 CAPSULE | Refills: 6 | Status: SHIPPED | OUTPATIENT
Start: 2018-04-12 | End: 2018-06-07 | Stop reason: SDUPTHER

## 2018-04-24 ENCOUNTER — RESULTS ENCOUNTER (OUTPATIENT)
Dept: ENDOCRINOLOGY | Age: 63
End: 2018-04-24

## 2018-04-24 DIAGNOSIS — Z78.0 POST-MENOPAUSAL: ICD-10-CM

## 2018-04-24 DIAGNOSIS — F45.8 TEETH GRINDING: ICD-10-CM

## 2018-05-17 LAB
CHOLEST SERPL-MCNC: 202 MG/DL (ref 0–200)
HBA1C MFR BLD: 5.8 % (ref 4.8–5.6)
HDLC SERPL-MCNC: 54 MG/DL (ref 40–60)
INTERPRETATION: NORMAL
LDLC SERPL CALC-MCNC: 120 MG/DL (ref 0–100)
Lab: NORMAL
TRIGL SERPL-MCNC: 139 MG/DL (ref 0–150)
VLDLC SERPL CALC-MCNC: 27.8 MG/DL (ref 5–40)

## 2018-06-07 RX ORDER — VENLAFAXINE HYDROCHLORIDE 150 MG/1
150 CAPSULE, EXTENDED RELEASE ORAL DAILY
Qty: 90 CAPSULE | Refills: 1 | Status: SHIPPED | OUTPATIENT
Start: 2018-06-07 | End: 2018-11-04 | Stop reason: SDUPTHER

## 2018-11-06 RX ORDER — VENLAFAXINE HYDROCHLORIDE 150 MG/1
CAPSULE, EXTENDED RELEASE ORAL
Qty: 90 CAPSULE | Refills: 1 | Status: SHIPPED | OUTPATIENT
Start: 2018-11-06 | End: 2019-05-01 | Stop reason: SDUPTHER

## 2019-05-01 RX ORDER — VENLAFAXINE HYDROCHLORIDE 150 MG/1
CAPSULE, EXTENDED RELEASE ORAL
Qty: 90 CAPSULE | Refills: 0 | Status: SHIPPED | OUTPATIENT
Start: 2019-05-01 | End: 2019-07-28 | Stop reason: SDUPTHER

## 2019-07-30 RX ORDER — VENLAFAXINE HYDROCHLORIDE 150 MG/1
CAPSULE, EXTENDED RELEASE ORAL
Qty: 90 CAPSULE | Refills: 0 | Status: SHIPPED | OUTPATIENT
Start: 2019-07-30

## 2019-10-25 RX ORDER — VENLAFAXINE HYDROCHLORIDE 150 MG/1
CAPSULE, EXTENDED RELEASE ORAL
Qty: 90 CAPSULE | Refills: 0 | OUTPATIENT
Start: 2019-10-25

## 2020-02-18 ENCOUNTER — APPOINTMENT (OUTPATIENT)
Dept: WOMENS IMAGING | Facility: HOSPITAL | Age: 65
End: 2020-02-18

## 2020-02-18 PROCEDURE — 77067 SCR MAMMO BI INCL CAD: CPT | Performed by: RADIOLOGY

## 2020-02-18 PROCEDURE — 77063 BREAST TOMOSYNTHESIS BI: CPT | Performed by: RADIOLOGY

## 2021-03-16 ENCOUNTER — BULK ORDERING (OUTPATIENT)
Dept: CASE MANAGEMENT | Facility: OTHER | Age: 66
End: 2021-03-16

## 2021-03-16 DIAGNOSIS — Z23 IMMUNIZATION DUE: ICD-10-CM

## 2021-06-15 ENCOUNTER — APPOINTMENT (OUTPATIENT)
Dept: WOMENS IMAGING | Facility: HOSPITAL | Age: 66
End: 2021-06-15

## 2021-06-15 PROCEDURE — 77063 BREAST TOMOSYNTHESIS BI: CPT | Performed by: RADIOLOGY

## 2021-06-15 PROCEDURE — 77067 SCR MAMMO BI INCL CAD: CPT | Performed by: RADIOLOGY

## 2023-02-07 ENCOUNTER — APPOINTMENT (OUTPATIENT)
Dept: WOMENS IMAGING | Facility: HOSPITAL | Age: 68
End: 2023-02-07
Payer: COMMERCIAL

## 2023-02-07 PROCEDURE — 77067 SCR MAMMO BI INCL CAD: CPT | Performed by: RADIOLOGY

## 2023-02-07 PROCEDURE — 77063 BREAST TOMOSYNTHESIS BI: CPT | Performed by: RADIOLOGY

## 2025-03-28 ENCOUNTER — APPOINTMENT (OUTPATIENT)
Dept: WOMENS IMAGING | Facility: HOSPITAL | Age: 70
End: 2025-03-28
Payer: COMMERCIAL

## 2025-03-28 PROCEDURE — 77063 BREAST TOMOSYNTHESIS BI: CPT | Performed by: RADIOLOGY

## 2025-03-28 PROCEDURE — 77067 SCR MAMMO BI INCL CAD: CPT | Performed by: RADIOLOGY
